# Patient Record
Sex: MALE | Race: BLACK OR AFRICAN AMERICAN | Employment: FULL TIME | URBAN - METROPOLITAN AREA
[De-identification: names, ages, dates, MRNs, and addresses within clinical notes are randomized per-mention and may not be internally consistent; named-entity substitution may affect disease eponyms.]

---

## 2020-12-22 ENCOUNTER — HOSPITAL ENCOUNTER (EMERGENCY)
Age: 30
Discharge: HOME | End: 2020-12-22
Attending: EMERGENCY MEDICINE
Payer: COMMERCIAL

## 2020-12-22 VITALS
HEART RATE: 90 BPM | TEMPERATURE: 97.1 F | DIASTOLIC BLOOD PRESSURE: 104 MMHG | WEIGHT: 197 LBS | SYSTOLIC BLOOD PRESSURE: 155 MMHG | OXYGEN SATURATION: 99 % | RESPIRATION RATE: 14 BRPM | BODY MASS INDEX: 29.86 KG/M2 | HEIGHT: 68 IN

## 2020-12-22 PROCEDURE — 99283 EMERGENCY DEPT VISIT LOW MDM: CPT

## 2020-12-22 PROCEDURE — 96372 THER/PROPH/DIAG INJ SC/IM: CPT

## 2020-12-22 PROCEDURE — 6360000002 HC RX W HCPCS: Performed by: EMERGENCY MEDICINE

## 2020-12-22 RX ORDER — KETOROLAC TROMETHAMINE 30 MG/ML
60 INJECTION, SOLUTION INTRAMUSCULAR; INTRAVENOUS ONCE
Status: COMPLETED | OUTPATIENT
Start: 2020-12-22 | End: 2020-12-22

## 2020-12-22 RX ADMIN — KETOROLAC TROMETHAMINE 60 MG: 30 INJECTION, SOLUTION INTRAMUSCULAR; INTRAVENOUS at 14:54

## 2020-12-22 ASSESSMENT — PAIN SCALES - GENERAL: PAINLEVEL_OUTOF10: 8

## 2020-12-22 NOTE — ED PROVIDER NOTES
Emergency Department Encounter    Patient: Babs Grady  MRN: [id number]  : 1990  Date of Evaluation: 2020  ED Provider:  Jesusita De Anda    Triage Chief Complaint:   Headache    [place]:  Babs Grady is a 35 y.o. male that presents with a headache, throbbing moderate constant has a history of migraines, his medications are not controlling it well enough that he came to the ER. This is the second episode in the last 3 months where he had to go to the ER, this is not the worst headache of his life, no visual changes or vomiting, no trauma, gait normal, no dysfunction of arms or legs, he said since this is his second episode he is going to follow-up with his neurologist as an outpatient as soon as he can. Nothing unusual noted about this headache    ROS - see HPI, below listed is current ROS at time of my eval:  General:  No fevers, no chills, no weakness  Eyes:  No recent vison changes, no discharge  ENT:  No sore throat, no nasal congestion, no hearing changes  Cardiovascular:  No chest pain  Respiratory:  No shortness of breath  Gastrointestinal:  No pain, no nausea, no vomiting, no diarrhea  Musculoskeletal:  No muscle pain, no joint pain  Skin:  No rash, no pruritis, no easy bruising  Neurologic:  No speech problems, + headache, no extremity numbness, no extremity tingling, no extremity weakness, no neck pain or stiffness  Psychiatric:  No anxiety  Extremities:  no edema, no pain    Past Medical History:   Diagnosis Date    Anxiety     Depression     Headache      History reviewed. No pertinent surgical history. History reviewed. No pertinent family history.   Social History     Socioeconomic History    Marital status: Single     Spouse name: Not on file    Number of children: Not on file    Years of education: Not on file    Highest education level: Not on file   Occupational History    Not on file   Social Needs    Financial resource strain: Not on file    Food insecurity     Worry: Not on file     Inability: Not on file    Transportation needs     Medical: Not on file     Non-medical: Not on file   Tobacco Use    Smoking status: Not on file   Substance and Sexual Activity    Alcohol use: Not on file    Drug use: Not on file    Sexual activity: Not on file   Lifestyle    Physical activity     Days per week: Not on file     Minutes per session: Not on file    Stress: Not on file   Relationships    Social connections     Talks on phone: Not on file     Gets together: Not on file     Attends Scientologist service: Not on file     Active member of club or organization: Not on file     Attends meetings of clubs or organizations: Not on file     Relationship status: Not on file    Intimate partner violence     Fear of current or ex partner: Not on file     Emotionally abused: Not on file     Physically abused: Not on file     Forced sexual activity: Not on file   Other Topics Concern    Not on file   Social History Narrative    Not on file     No current facility-administered medications for this encounter. No current outpatient medications on file. Allergies   Allergen Reactions    Ativan [Lorazepam]        Nursing Notes Reviewed    Physical Exam:  Triage VS:    ED Triage Vitals   Enc Vitals Group      BP 12/22/20 1437 (!) 155/104      Pulse 12/22/20 1436 90      Resp 12/22/20 1436 14      Temp 12/22/20 1436 97.1 °F (36.2 °C)      Temp src --       SpO2 12/22/20 1436 99 %      Weight 12/22/20 1436 197 lb (89.4 kg)      Height 12/22/20 1436 5' 8\" (1.727 m)      Head Circumference --       Peak Flow --       Pain Score --       Pain Loc --       Pain Edu? --       Excl. in [address]? --        My pulse ox interpretation is - normal    General appearance:  No acute distress. Skin:  Warm. Dry. Eye:  Extraocular movements intact. PERRL   Ears, nose, mouth and throat:  Oral mucosa moist   Neck:  Trachea midline. Extremity:  No swelling.   Normal ROM     Heart:  Regular  Perfusion: intact  Respiratory: Respirations nonlabored. Abdominal:   Non distended. Neurological:  Alert and oriented times 3.  5 out of 5 motor strength in extremities, sensation intact to light touch in extremities, cranial nerves           grossly intact, visual fields intact, negative pronator drift, rapid alternating movements intact    I have reviewed and interpreted all of the currently available lab results from this visit (if applicable):  No results found for this visit on 12/22/20. Radiographs (if obtained):  Radiologist's Report Reviewed:  No results found. MDM:  Patient presenting with a headache. Patient's neurologic exam is intact and nonfocal, and there is no evidence of meningeal signs. I completed a structured, evidence-based clinical evaluation to screen for subarachnoid hemorrhage and meningitis. The evidence indicates that the patient is very low risk for these and this is consistent with my clinical intuition. There is no history of significant head trauma. Patient was given medications here in the emergency department, on reevaluation patient is starting to feel better. The risk of further workup or hospitalization is likely higher than the risk of the patient having a subarachnoid hemorrhage or meningitis. It is, therefore, in the patients best interest not to do additional emergent testing, including but not limited to cranial imaging and or lumbar puncture her in the emergency department, based on the patient's presentation, history, and emergency department course  At this point I do believe we can discharge patient, they need to follow-up with their primary care physician and/or neurologist, they understand and agree with the plan, return warnings given. Clinical Impression:  1.  Acute nonintractable headache, unspecified headache type      Disposition referral (if applicable):  Rene Suero, APRN - CNP  [address] [address]  [phone]    In 1 week      Disposition medications (if applicable):  New Prescriptions    No medications on file     ED Provider Disposition Time  DISPOSITION Decision To Discharge 12/22/2020 03:16:42 PM      Comment: Please note this report has been produced using speech recognition software and may contain errors related to that system including errors in grammar, punctuation, and spelling, as well as words and phrases that may be inappropriate. Efforts were made to edit the dictations.         Gaston Arambula MD  12/22/20 7332

## 2022-10-25 ENCOUNTER — APPOINTMENT (OUTPATIENT)
Dept: GENERAL RADIOLOGY | Age: 32
End: 2022-10-25
Payer: COMMERCIAL

## 2022-10-25 ENCOUNTER — HOSPITAL ENCOUNTER (EMERGENCY)
Age: 32
Discharge: HOME | End: 2022-10-25
Attending: STUDENT IN AN ORGANIZED HEALTH CARE EDUCATION/TRAINING PROGRAM
Payer: COMMERCIAL

## 2022-10-25 VITALS
HEART RATE: 78 BPM | RESPIRATION RATE: 16 BRPM | TEMPERATURE: 97.5 F | OXYGEN SATURATION: 96 % | HEIGHT: 68 IN | BODY MASS INDEX: 33.04 KG/M2 | WEIGHT: 218 LBS | DIASTOLIC BLOOD PRESSURE: 88 MMHG | SYSTOLIC BLOOD PRESSURE: 128 MMHG

## 2022-10-25 DIAGNOSIS — M25.572 ACUTE LEFT ANKLE PAIN: Primary | ICD-10-CM

## 2022-10-25 PROCEDURE — 6370000000 HC RX 637 (ALT 250 FOR IP): Performed by: STUDENT IN AN ORGANIZED HEALTH CARE EDUCATION/TRAINING PROGRAM

## 2022-10-25 PROCEDURE — 99283 EMERGENCY DEPT VISIT LOW MDM: CPT

## 2022-10-25 PROCEDURE — 73610 X-RAY EXAM OF ANKLE: CPT

## 2022-10-25 RX ORDER — IBUPROFEN 400 MG/1
800 TABLET ORAL ONCE
Status: COMPLETED | OUTPATIENT
Start: 2022-10-25 | End: 2022-10-25

## 2022-10-25 RX ADMIN — IBUPROFEN 800 MG: 400 TABLET, FILM COATED ORAL at 21:49

## 2022-10-25 ASSESSMENT — PAIN SCALES - GENERAL
PAINLEVEL_OUTOF10: 9
PAINLEVEL_OUTOF10: 9

## 2022-10-25 ASSESSMENT — PAIN - FUNCTIONAL ASSESSMENT: PAIN_FUNCTIONAL_ASSESSMENT: 0-10

## 2022-10-26 NOTE — DISCHARGE INSTRUCTIONS
Follow-up with primary care in the next 3 days  Take OTC medication as needed for pain  Follow-up with Orthopedics per referral  Return to the ED if symptoms persist or worsen

## 2022-10-26 NOTE — ED PROVIDER NOTES
Emergency Department Encounter    Patient: Evie Barajas  MRN: [id number]  : 1990  Date of Evaluation: 10/26/2022  ED Provider:  Mechelle Mckee DO    Triage Chief Complaint:   Ankle Pain    [place]:  Evie Barajas is a 33 y.o. male presenting to the ED for evaluation of left ankle pain following an injury at the gymn. Patient said he felt a pop while he was exercising at the gym. No history of numbness or weakness of the left ankle or foot. ROS - see HPI, below listed is current ROS at time of my eval:  General:  No fevers, no chills, no weakness  Eyes:  No recent vison changes, no discharge  ENT:  No sore throat, no nasal congestion, no hearing changes  Cardiovascular:  No chest pain, no palpitations  Respiratory:  No shortness of breath, no cough, no wheezing  Gastrointestinal:  No pain, no nausea, no vomiting, no diarrhea  Musculoskeletal: Left ankle pain  Skin:  No rash, no pruritis, no easy bruising  Neurologic:  No speech problems, no headache, no extremity numbness, no extremity tingling, no extremity weakness  Psychiatric:  No anxiety  Genitourinary:  No dysuria, no hematuria  Endocrine:  No unexpected weight gain, no unexpected weight loss  Extremities:  no edema, no pain    Past Medical History:   Diagnosis Date    Anxiety     Depression     Headache      History reviewed. No pertinent surgical history. History reviewed. No pertinent family history.   Social History     Socioeconomic History    Marital status: Single     Spouse name: Not on file    Number of children: Not on file    Years of education: Not on file    Highest education level: Not on file   Occupational History    Not on file   Tobacco Use    Smoking status: Not on file    Smokeless tobacco: Not on file   Substance and Sexual Activity    Alcohol use: Not on file    Drug use: Not on file    Sexual activity: Not on file   Other Topics Concern    Not on file   Social History Narrative    Not on file     Social Determinants of Health     Financial Resource Strain: Not on file   Food Insecurity: Not on file   Transportation Needs: Not on file   Physical Activity: Not on file   Stress: Not on file   Social Connections: Not on file   Intimate Partner Violence: Not on file   Housing Stability: Not on file     No current facility-administered medications for this encounter. No current outpatient medications on file. Allergies   Allergen Reactions    Ativan [Lorazepam]        Nursing Notes Reviewed    Physical Exam:  Triage VS:    ED Triage Vitals [10/25/22 2052]   Enc Vitals Group      BP (!) 134/90      Heart Rate 87      Resp 15      Temp 97.5 °F (36.4 °C)      Temp Source Temporal      SpO2 96 %      Weight 218 lb (98.9 kg)      Height 5' 8\" (1.727 m)      Head Circumference       Peak Flow       Pain Score       Pain Loc       Pain Edu? Excl. in [address]? My pulse ox interpretation is - normal    General appearance:  No acute distress. Skin:  Warm. Dry. Eye:  Extraocular movements intact. Ears, nose, mouth and throat:  Oral mucosa moist   Neck:  Trachea midline. Extremity: Mild left ankle tenderness no erythema, no swelling. No deformity. Normal ROM. Distal pulses intact. Heart:  Regular rate and rhythm, normal S1 & S2, no extra heart sounds. Perfusion:  intact  Respiratory:  Lungs clear to auscultation bilaterally. Respirations nonlabored. Abdominal:  Normal bowel sounds. Soft. Nontender. Non distended. Back:  No CVA tenderness to palpation     Neurological:  Alert and oriented times 3. No focal neuro deficits. Psychiatric:  Appropriate    I have reviewed and interpreted all of the currently available lab results from this visit (if applicable):  No results found for this visit on 10/25/22.    Radiographs (if obtained):  Radiologist's Report Reviewed:  XR ANKLE LEFT (MIN 3 VIEWS)   Final Result   No fracture               EKG (if obtained): (All EKG's are interpreted by myself in the absence of a cardiologist)      MDM:  69-year-old male presenting to the ED for evaluation of left ankle pain following an injury at the chain. Physical examination is remarkable for mild tenderness with no obvious deformity, erythema, swelling or warmth. Distal pulses are intact. X-ray of the left ankle is unremarkable. Patient received pain medication in the ED. Patient had the left ankle immobilized with a brace and patient discharged with orthopedic outpatient follow-up referral.    Patient is given opportunity to ask questions and all questions are answered in appropriate details without the use of medical jargon  Patient verbalized his understanding and agreement with the plan    Clinical Impression:  1. Acute left ankle pain      Disposition referral (if applicable):  Anderson Gustafson, APRN - CNP  [address] [facility] [address] [facility]  [phone]    Schedule an appointment as soon as possible for a visit in 3 days      Keisha Johns, [address] [address] [place]    Schedule an appointment as soon as possible for a visit in 3 days    Disposition medications (if applicable): There are no discharge medications for this patient. ED Provider Disposition Time  DISPOSITION Decision To Discharge 10/25/2022 10:48:06 PM      Comment: Please note this report has been produced using speech recognition software and may contain errors related to that system including errors in grammar, punctuation, and spelling, as well as words and phrases that may be inappropriate. Efforts were made to edit the dictations.         [address] [facility],[address], DO  10/26/22 7442